# Patient Record
Sex: MALE | Race: OTHER | NOT HISPANIC OR LATINO | RURAL
[De-identification: names, ages, dates, MRNs, and addresses within clinical notes are randomized per-mention and may not be internally consistent; named-entity substitution may affect disease eponyms.]

---

## 2024-09-30 ENCOUNTER — OFFICE VISIT (OUTPATIENT)
Dept: RURAL CLINIC 2 | Facility: CLINIC | Age: 64
End: 2024-09-30
Payer: COMMERCIAL

## 2024-09-30 ENCOUNTER — LAB OUTSIDE AN ENCOUNTER (OUTPATIENT)
Dept: RURAL CLINIC 2 | Facility: CLINIC | Age: 64
End: 2024-09-30

## 2024-09-30 ENCOUNTER — DASHBOARD ENCOUNTERS (OUTPATIENT)
Age: 64
End: 2024-09-30

## 2024-09-30 VITALS
HEART RATE: 86 BPM | TEMPERATURE: 97.3 F | DIASTOLIC BLOOD PRESSURE: 86 MMHG | SYSTOLIC BLOOD PRESSURE: 137 MMHG | BODY MASS INDEX: 27.11 KG/M2 | WEIGHT: 183 LBS | HEIGHT: 69 IN

## 2024-09-30 DIAGNOSIS — Z86.010 HISTORY OF ADENOMATOUS POLYP OF COLON: ICD-10-CM

## 2024-09-30 DIAGNOSIS — I10 ESSENTIAL HYPERTENSION: ICD-10-CM

## 2024-09-30 PROBLEM — 429047008: Status: ACTIVE | Noted: 2024-09-30

## 2024-09-30 PROBLEM — 59621000: Status: ACTIVE | Noted: 2024-09-30

## 2024-09-30 PROCEDURE — 99203 OFFICE O/P NEW LOW 30 MIN: CPT | Performed by: NURSE PRACTITIONER

## 2024-09-30 RX ORDER — LOSARTAN POTASSIUM 100 MG/1
TABLET, FILM COATED ORAL
Qty: 90 TABLET | Status: ACTIVE | COMMUNITY

## 2024-09-30 RX ORDER — AMLODIPINE BESYLATE 5 MG/1
TABLET ORAL
Qty: 180 TABLET | Status: ACTIVE | COMMUNITY

## 2024-09-30 NOTE — HPI-ZZZTODAY'S VISIT
The patient is a 64-year-old gentleman self-referred for the evaluation of a colonoscopy for history of colon polyps.  His previous colonoscopy was completed 11 years ago.  Results available and reviewed and a tubular adenoma from the cecum was removed.  Family history is negative for colon cancer.  He offers no complaints of abdominal pain, constipation, diarrhea or rectal bleeding.  Past medical history includes hypertension otherwise noncontributory.

## 2024-11-27 ENCOUNTER — OFFICE VISIT (OUTPATIENT)
Dept: RURAL MEDICAL CENTER 4 | Facility: MEDICAL CENTER | Age: 64
End: 2024-11-27

## 2025-05-29 ENCOUNTER — TELEPHONE ENCOUNTER (OUTPATIENT)
Dept: RURAL CLINIC 2 | Facility: CLINIC | Age: 65
End: 2025-05-29

## 2025-06-10 ENCOUNTER — OFFICE VISIT (OUTPATIENT)
Dept: RURAL CLINIC 2 | Facility: CLINIC | Age: 65
End: 2025-06-10
Payer: COMMERCIAL

## 2025-06-10 ENCOUNTER — LAB OUTSIDE AN ENCOUNTER (OUTPATIENT)
Dept: RURAL CLINIC 2 | Facility: CLINIC | Age: 65
End: 2025-06-10

## 2025-06-10 DIAGNOSIS — I10 ESSENTIAL HYPERTENSION: ICD-10-CM

## 2025-06-10 DIAGNOSIS — Z86.0101 PERSONAL HISTORY OF ADENOMATOUS AND SERRATED COLON POLYPS: ICD-10-CM

## 2025-06-10 PROCEDURE — 99213 OFFICE O/P EST LOW 20 MIN: CPT | Performed by: NURSE PRACTITIONER

## 2025-06-10 RX ORDER — AMLODIPINE BESYLATE 5 MG/1
TABLET ORAL
Qty: 180 TABLET | Status: ACTIVE | COMMUNITY

## 2025-06-10 RX ORDER — LOSARTAN POTASSIUM 100 MG/1
TABLET, FILM COATED ORAL
Qty: 90 TABLET | Status: ACTIVE | COMMUNITY

## 2025-06-10 NOTE — HPI-ZZZTODAY'S VISIT
The patient is a 64-year-old gentleman self-referred for the evaluation of a colonoscopy for history of colon polyps.  His previous colonoscopy was completed 11 years ago.  Results available and reviewed and a tubular adenoma from the cecum was removed.  Family history is negative for colon cancer.  He offers no complaints of abdominal pain, constipation, diarrhea or rectal bleeding.  Past medical history includes hypertension otherwise noncontributory.  06/10/2025:  The patient is a 65-year-old gentleman and comes in today to schedule for a surveillance colonoscopy for personal history of colon polyps.  His previous exam was completed 12 years ago with findings of a tubular adenoma.  He offers no complaints of abdominal pain, constipation, diarrhea or rectal bleeding.  Family history is negative for colon cancer.  Past medical history includes hypertension, otherwise noncontributory.